# Patient Record
Sex: FEMALE | Race: WHITE | URBAN - METROPOLITAN AREA
[De-identification: names, ages, dates, MRNs, and addresses within clinical notes are randomized per-mention and may not be internally consistent; named-entity substitution may affect disease eponyms.]

---

## 2019-03-14 ENCOUNTER — OFFICE VISIT (OUTPATIENT)
Dept: INTERNAL MEDICINE CLINIC | Age: 39
End: 2019-03-14
Payer: COMMERCIAL

## 2019-03-14 VITALS
HEART RATE: 95 BPM | DIASTOLIC BLOOD PRESSURE: 91 MMHG | WEIGHT: 193.6 LBS | OXYGEN SATURATION: 100 % | TEMPERATURE: 98.1 F | SYSTOLIC BLOOD PRESSURE: 152 MMHG | RESPIRATION RATE: 16 BRPM

## 2019-03-14 DIAGNOSIS — R03.0 ELEVATED BLOOD PRESSURE READING: ICD-10-CM

## 2019-03-14 DIAGNOSIS — K64.9 HEMORRHOIDS, UNSPECIFIED HEMORRHOID TYPE: Primary | ICD-10-CM

## 2019-03-14 DIAGNOSIS — M54.50 ACUTE BILATERAL LOW BACK PAIN WITHOUT SCIATICA: ICD-10-CM

## 2019-03-14 LAB
BILIRUBIN URINE: NEGATIVE MG/DL
BLOOD, URINE: NEGATIVE
CLARITY: ABNORMAL
COLOR: ABNORMAL
GLUCOSE URINE: NEGATIVE MG/DL
KETONES, URINE: NEGATIVE MG/DL
LEUKOCYTE ESTERASE, URINE: ABNORMAL
MICROSCOPIC EXAMINATION: YES
NITRITE, URINE: NEGATIVE
PH UA: 7 (ref 5–8)
PROTEIN UA: NEGATIVE MG/DL
SPECIFIC GRAVITY UA: 1.01 (ref 1–1.03)
UROBILINOGEN, URINE: 0.2 E.U./DL

## 2019-03-14 PROCEDURE — 99213 OFFICE O/P EST LOW 20 MIN: CPT | Performed by: STUDENT IN AN ORGANIZED HEALTH CARE EDUCATION/TRAINING PROGRAM

## 2019-03-14 PROCEDURE — 81003 URINALYSIS AUTO W/O SCOPE: CPT

## 2019-03-14 ASSESSMENT — ENCOUNTER SYMPTOMS
NAUSEA: 0
VOMITING: 0
SHORTNESS OF BREATH: 0
CONSTIPATION: 0
COUGH: 0
BACK PAIN: 1
ABDOMINAL PAIN: 0
DIARRHEA: 0
SINUS PAIN: 0

## 2019-04-26 ENCOUNTER — OFFICE VISIT (OUTPATIENT)
Dept: INTERNAL MEDICINE CLINIC | Age: 39
End: 2019-04-26
Payer: COMMERCIAL

## 2019-04-26 VITALS
BODY MASS INDEX: 31.69 KG/M2 | SYSTOLIC BLOOD PRESSURE: 132 MMHG | HEART RATE: 73 BPM | TEMPERATURE: 98.2 F | OXYGEN SATURATION: 98 % | RESPIRATION RATE: 14 BRPM | DIASTOLIC BLOOD PRESSURE: 88 MMHG | HEIGHT: 65 IN | WEIGHT: 190.2 LBS

## 2019-04-26 DIAGNOSIS — R10.11 RIGHT UPPER QUADRANT ABDOMINAL PAIN: Primary | ICD-10-CM

## 2019-04-26 DIAGNOSIS — R12 HEARTBURN: ICD-10-CM

## 2019-04-26 DIAGNOSIS — R10.11 RIGHT UPPER QUADRANT ABDOMINAL PAIN: ICD-10-CM

## 2019-04-26 LAB
A/G RATIO: 1.3 (ref 1.1–2.2)
ALBUMIN SERPL-MCNC: 4.8 G/DL (ref 3.4–5)
ALP BLD-CCNC: 95 U/L (ref 40–129)
ALT SERPL-CCNC: 77 U/L (ref 10–40)
ANION GAP SERPL CALCULATED.3IONS-SCNC: 14 MMOL/L (ref 3–16)
AST SERPL-CCNC: 38 U/L (ref 15–37)
BASOPHILS ABSOLUTE: 0 K/UL (ref 0–0.2)
BASOPHILS RELATIVE PERCENT: 0.6 %
BILIRUB SERPL-MCNC: <0.2 MG/DL (ref 0–1)
BUN BLDV-MCNC: 11 MG/DL (ref 7–20)
CALCIUM SERPL-MCNC: 10.1 MG/DL (ref 8.3–10.6)
CHLORIDE BLD-SCNC: 104 MMOL/L (ref 99–110)
CO2: 25 MMOL/L (ref 21–32)
CREAT SERPL-MCNC: 0.7 MG/DL (ref 0.6–1.1)
EOSINOPHILS ABSOLUTE: 0.1 K/UL (ref 0–0.6)
EOSINOPHILS RELATIVE PERCENT: 1.7 %
GFR AFRICAN AMERICAN: >60
GFR NON-AFRICAN AMERICAN: >60
GLOBULIN: 3.6 G/DL
GLUCOSE BLD-MCNC: 93 MG/DL (ref 70–99)
HCT VFR BLD CALC: 42.9 % (ref 36–48)
HEMOGLOBIN: 14.4 G/DL (ref 12–16)
LYMPHOCYTES ABSOLUTE: 2.2 K/UL (ref 1–5.1)
LYMPHOCYTES RELATIVE PERCENT: 31.7 %
MCH RBC QN AUTO: 28.2 PG (ref 26–34)
MCHC RBC AUTO-ENTMCNC: 33.5 G/DL (ref 31–36)
MCV RBC AUTO: 84.1 FL (ref 80–100)
MONOCYTES ABSOLUTE: 0.4 K/UL (ref 0–1.3)
MONOCYTES RELATIVE PERCENT: 6.2 %
NEUTROPHILS ABSOLUTE: 4.2 K/UL (ref 1.7–7.7)
NEUTROPHILS RELATIVE PERCENT: 59.8 %
PDW BLD-RTO: 14 % (ref 12.4–15.4)
PLATELET # BLD: 344 K/UL (ref 135–450)
PMV BLD AUTO: 6.9 FL (ref 5–10.5)
POTASSIUM SERPL-SCNC: 4.5 MMOL/L (ref 3.5–5.1)
RBC # BLD: 5.11 M/UL (ref 4–5.2)
SODIUM BLD-SCNC: 143 MMOL/L (ref 136–145)
TOTAL PROTEIN: 8.4 G/DL (ref 6.4–8.2)
WBC # BLD: 7 K/UL (ref 4–11)

## 2019-04-26 PROCEDURE — 1036F TOBACCO NON-USER: CPT | Performed by: INTERNAL MEDICINE

## 2019-04-26 PROCEDURE — G8417 CALC BMI ABV UP PARAM F/U: HCPCS | Performed by: INTERNAL MEDICINE

## 2019-04-26 PROCEDURE — 99213 OFFICE O/P EST LOW 20 MIN: CPT | Performed by: INTERNAL MEDICINE

## 2019-04-26 PROCEDURE — G8427 DOCREV CUR MEDS BY ELIG CLIN: HCPCS | Performed by: INTERNAL MEDICINE

## 2019-04-26 RX ORDER — FAMOTIDINE 20 MG/1
20 TABLET, FILM COATED ORAL 2 TIMES DAILY PRN
Qty: 60 TABLET | Refills: 0 | Status: SHIPPED | OUTPATIENT
Start: 2019-04-26

## 2019-04-26 ASSESSMENT — ENCOUNTER SYMPTOMS
ABDOMINAL PAIN: 1
BACK PAIN: 0
SHORTNESS OF BREATH: 0
DIARRHEA: 0
CONSTIPATION: 0
ABDOMINAL DISTENTION: 0
NAUSEA: 0
BLOOD IN STOOL: 0
VOMITING: 0

## 2019-04-26 ASSESSMENT — PATIENT HEALTH QUESTIONNAIRE - PHQ9
1. LITTLE INTEREST OR PLEASURE IN DOING THINGS: 0
SUM OF ALL RESPONSES TO PHQ QUESTIONS 1-9: 0
2. FEELING DOWN, DEPRESSED OR HOPELESS: 0
SUM OF ALL RESPONSES TO PHQ QUESTIONS 1-9: 0
SUM OF ALL RESPONSES TO PHQ9 QUESTIONS 1 & 2: 0

## 2019-04-26 NOTE — PROGRESS NOTES
Zachary Montoya   YOB: 1980    Date of Visit:  4/26/2019    Chief Complaint   Patient presents with    New Patient    Establish Care    Abdominal Pain    Gastroesophageal Reflux       HPI  Pt c/o right upper quadrant abdominal pain for at least 1 month. Abdominal pain is dull and intermittent. Pt denies nausea, vomiting, fever. Pain is not related to meals. Quit drinking alcohol 3 weeks after drinking a couple of beers per day. Pt states she has had heartburn for the past years. Pt states it comes on and goes away. Pt states no particular food causes but it is better with stopping drinking. Pt has heartburn was twice a week until she stopped drinking 3 weeks ago. Pt hasn't had hardly any heartburn in the past 3 weeks. Pt states she has taken Tums. Pt drinks 2-3 cups of coffee per day, eats spicy foods, drinks iced tea,    Review of Systems   Constitutional: Negative for appetite change, chills and fever. Respiratory: Negative for shortness of breath. Cardiovascular: Negative for chest pain. Gastrointestinal: Positive for abdominal pain. Negative for abdominal distention, blood in stool, constipation, diarrhea, nausea and vomiting. Genitourinary: Negative for dysuria, frequency and hematuria. Musculoskeletal: Negative for back pain. Neurological: Negative for dizziness. History reviewed. No pertinent past medical history. History reviewed. No pertinent surgical history. No outpatient medications have been marked as taking for the 4/26/19 encounter (Office Visit) with Olinda Verde MD.         Allergies   Allergen Reactions    Pcn [Penicillins]        Social History     Tobacco Use    Smoking status: Never Smoker    Smokeless tobacco: Never Used   Substance Use Topics    Alcohol use: Yes     Alcohol/week: 8.4 oz     Types: 14 Cans of beer per week     Comment: have not drank in three weeks.        Family History   Problem Relation Age of Onset    High Blood Pressure Mother     Heart Disease Father        Immunization History   Administered Date(s) Administered    Tdap (Boostrix, Adacel) 04/29/2014       Vitals:    04/26/19 1041   BP: 132/88   Site: Right Upper Arm   Position: Sitting   Cuff Size: Large Adult   Pulse: 73   Resp: 14   Temp: 98.2 °F (36.8 °C)   TempSrc: Oral   SpO2: 98%   Weight: 190 lb 3.2 oz (86.3 kg)   Height: 5' 5\" (1.651 m)     Body mass index is 31.65 kg/m². Physical Exam   Constitutional: She appears well-developed and well-nourished. No distress. HENT:   Mouth/Throat: Oropharynx is clear and moist.   Eyes: Pupils are equal, round, and reactive to light. EOM are normal.   Neck: Neck supple. No thyromegaly present. Cardiovascular: Normal rate, regular rhythm, S1 normal, S2 normal and normal heart sounds. Exam reveals no gallop and no friction rub. No murmur heard. Pulmonary/Chest: Effort normal and breath sounds normal. No respiratory distress. She has no wheezes. Abdominal: Soft. Normal appearance and bowel sounds are normal. She exhibits no distension. There is no hepatosplenomegaly. There is no tenderness. Musculoskeletal: She exhibits no edema. Nursing note reviewed. No results found for this visit on 04/26/19. Assessment/Plan     1. Right upper quadrant abdominal pain  - US Abdomen Complete; Future  - CBC Auto Differential; Future  - Comprehensive Metabolic Panel; Future  -Tylenol or Advil as needed    2. Heartburn  - famotidine (PEPCID) 20 MG tablet; Take 1 tablet by mouth 2 times daily as needed (heartburn)  Dispense: 60 tablet; Refill: 0  - Decrease caffeine, avoid spicy foods, avoid tomato based foods  - Eat small meals instead of large meals  - Wait 2-3 hours after eating before lying down        Discussed medications with patient, who voiced understanding of their use and indications. All questions answered.     Return in about 2 weeks (around 5/10/2019) for annual physical exam, abdominal pain, and heartburn.

## 2019-05-01 ENCOUNTER — HOSPITAL ENCOUNTER (OUTPATIENT)
Dept: ULTRASOUND IMAGING | Age: 39
Discharge: HOME | End: 2019-05-01
Payer: COMMERCIAL

## 2019-05-01 DIAGNOSIS — R10.11 RIGHT UPPER QUADRANT ABDOMINAL PAIN: ICD-10-CM

## 2019-05-01 PROCEDURE — 76705 ECHO EXAM OF ABDOMEN: CPT

## 2019-05-21 ENCOUNTER — OFFICE VISIT (OUTPATIENT)
Dept: INTERNAL MEDICINE CLINIC | Age: 39
End: 2019-05-21
Payer: COMMERCIAL

## 2019-05-21 VITALS
DIASTOLIC BLOOD PRESSURE: 88 MMHG | SYSTOLIC BLOOD PRESSURE: 120 MMHG | OXYGEN SATURATION: 99 % | WEIGHT: 194.6 LBS | TEMPERATURE: 98.1 F | RESPIRATION RATE: 14 BRPM | BODY MASS INDEX: 32.42 KG/M2 | HEIGHT: 65 IN | HEART RATE: 81 BPM

## 2019-05-21 DIAGNOSIS — Z00.00 ANNUAL PHYSICAL EXAM: Primary | ICD-10-CM

## 2019-05-21 DIAGNOSIS — R10.11 RIGHT UPPER QUADRANT ABDOMINAL PAIN: ICD-10-CM

## 2019-05-21 DIAGNOSIS — R12 HEARTBURN: ICD-10-CM

## 2019-05-21 DIAGNOSIS — R74.8 ELEVATED LIVER ENZYMES: ICD-10-CM

## 2019-05-21 PROCEDURE — 81002 URINALYSIS NONAUTO W/O SCOPE: CPT | Performed by: INTERNAL MEDICINE

## 2019-05-21 PROCEDURE — 99395 PREV VISIT EST AGE 18-39: CPT | Performed by: INTERNAL MEDICINE

## 2019-05-21 ASSESSMENT — ENCOUNTER SYMPTOMS: ABDOMINAL PAIN: 1

## 2019-05-21 NOTE — PATIENT INSTRUCTIONS
1. Annual physical exam  - POCT Urinalysis no Micro  - Comprehensive Metabolic Panel; Future  - Lipid Panel; Future  - TSH without Reflex; Future  - Patient plans to schedule pap smear with Gynecology  - Tdap done on 4/29/14  - Regular aerobic exercise    2. Right upper quadrant abdominal pain  - abdominal ultrasound unremarkable for cause of pain  - Referral to Kimberly Escobar MD, Gastroenterology, [facility]    3. Heartburn  -stable  -Continue lifestyle modification  -Decrease caffeine, avoid spicy foods, avoid tomato based foods  -Eat small meals instead of large meals  -Wait 2-3 hours after eating before lying down    4. Elevated liver enzymes   - Comprehensive Metabolic Panel; Future  - HEPATITIS PANEL, ACUTE; Future  - Referral to Kimbrely Escobar MD, Gastroenterology, [facility]          Patient Education        Well Visit, Ages 25 to 48: Care Instructions  Your Care Instructions    Physical exams can help you stay healthy. Your doctor has checked your overall health and may have suggested ways to take good care of yourself. He or she also may have recommended tests. At home, you can help prevent illness with healthy eating, regular exercise, and other steps. Follow-up care is a key part of your treatment and safety. Be sure to make and go to all appointments, and call your doctor if you are having problems. It's also a good idea to know your test results and keep a list of the medicines you take. How can you care for yourself at home? · Reach and stay at a healthy weight. This will lower your risk for many problems, such as obesity, diabetes, heart disease, and high blood pressure. · Get at least 30 minutes of physical activity on most days of the week. Walking is a good choice. You also may want to do other activities, such as running, swimming, cycling, or playing tennis or team sports. Discuss any changes in your exercise program with your doctor.   · Do not smoke or allow others to smoke around you. If you need help quitting, talk to your doctor about stop-smoking programs and medicines. These can increase your chances of quitting for good. · Talk to your doctor about whether you have any risk factors for sexually transmitted infections (STIs). Having one sex partner (who does not have STIs and does not have sex with anyone else) is a good way to avoid these infections. · Use birth control if you do not want to have children at this time. Talk with your doctor about the choices available and what might be best for you. · Protect your skin from too much sun. When you're outdoors from 10 a.m. to 4 p.m., stay in the shade or cover up with clothing and a hat with a wide brim. Wear sunglasses that block UV rays. Even when it's cloudy, put broad-spectrum sunscreen (SPF 30 or higher) on any exposed skin. · See a dentist one or two times a year for checkups and to have your teeth cleaned. · Wear a seat belt in the car. Follow your doctor's advice about when to have certain tests. These tests can spot problems early. For everyone  · Cholesterol. Have the fat (cholesterol) in your blood tested after age 21. Your doctor will tell you how often to have this done based on your age, family history, or other things that can increase your risk for heart disease. · Blood pressure. Have your blood pressure checked during a routine doctor visit. Your doctor will tell you how often to check your blood pressure based on your age, your blood pressure results, and other factors. · Vision. Talk with your doctor about how often to have a glaucoma test.  · Diabetes. Ask your doctor whether you should have tests for diabetes. · Colon cancer. Your risk for colorectal cancer gets higher as you get older. Some experts say that adults should start regular screening at age 48 and stop at age 76. Others say to start before age 48 or continue after age 76.  Talk with your doctor about your risk and when to start and stop screening. For women  · Breast exam and mammogram. Talk to your doctor about when you should have a clinical breast exam and a mammogram. Medical experts differ on whether and how often women under 50 should have these tests. Your doctor can help you decide what is right for you. · Cervical cancer screening test and pelvic exam. Begin with a Pap test at age 24. The test often is part of a pelvic exam. Starting at age 27, you may choose to have a Pap test, an HPV test, or both tests at the same time (called co-testing). Talk with your doctor about how often to have testing. · Tests for sexually transmitted infections (STIs). Ask whether you should have tests for STIs. You may be at risk if you have sex with more than one person, especially if your partners do not wear condoms. For men  · Tests for sexually transmitted infections (STIs). Ask whether you should have tests for STIs. You may be at risk if you have sex with more than one person, especially if you do not wear a condom. · Testicular cancer exam. Ask your doctor whether you should check your testicles regularly. · Prostate exam. Talk to your doctor about whether you should have a blood test (called a PSA test) for prostate cancer. Experts differ on whether and when men should have this test. Some experts suggest it if you are older than 39 and are -American or have a father or brother who got prostate cancer when he was younger than 72. When should you call for help? Watch closely for changes in your health, and be sure to contact your doctor if you have any problems or symptoms that concern you. Where can you learn more? Go to [email or web address] org and sign in to your [employer] account. Enter P072 in the [facility] box to learn more about \"Well Visit, Ages 25 to 48: Care Instructions. \"     If you do not have an account, please click on the \"Sign Up Now\" link.   Current as of: December 13, 2018  Content Version: 12.0  © 5199-4771 [employer]wise, Incorporated. Care instructions adapted under license by [facility] [place] ([facility]). If you have questions about a medical condition or this instruction, always ask your healthcare professional. [address] any warranty or liability for your use of this information.

## 2019-05-21 NOTE — PROGRESS NOTES
is 32.38 kg/m². Physical Exam   Constitutional: She is oriented to person, place, and time. She appears well-developed and well-nourished. No distress. HENT:   Head: Normocephalic and atraumatic. Right Ear: Hearing, tympanic membrane and ear canal normal.   Left Ear: Hearing, tympanic membrane and ear canal normal.   Nose: Nose normal.   Mouth/Throat: Uvula is midline, oropharynx is clear and moist and mucous membranes are normal.   Eyes: Pupils are equal, round, and reactive to light. Conjunctivae, EOM and lids are normal.   Neck: Neck supple. Carotid bruit is not present. No thyromegaly present. Cardiovascular: Normal rate, regular rhythm, S1 normal, S2 normal, normal heart sounds and intact distal pulses. Exam reveals no gallop and no friction rub. No murmur heard. Pulmonary/Chest: Effort normal and breath sounds normal. No respiratory distress. She has no wheezes. She has no rhonchi. She has no rales. Right breast exhibits no inverted nipple, no mass, no nipple discharge, no skin change and no tenderness. Left breast exhibits no inverted nipple, no mass, no nipple discharge, no skin change and no tenderness. Breasts are symmetrical.   Abdominal: Soft. Bowel sounds are normal. She exhibits no distension and no mass. There is no hepatosplenomegaly. There is no tenderness. There is no rebound. Genitourinary:   Genitourinary Comments: deferred   Musculoskeletal: Normal range of motion. She exhibits no edema. Right shoulder: She exhibits normal range of motion and no tenderness. Left shoulder: She exhibits normal range of motion and no tenderness. Right elbow: She exhibits no swelling. No tenderness found. Left elbow: She exhibits no swelling. No tenderness found. Right wrist: She exhibits no tenderness and no swelling. Left wrist: She exhibits no tenderness and no swelling. Right hip: She exhibits no tenderness.         Left hip: She exhibits no CREATININE 04/26/2019 0.7     GFR Non- 04/26/2019 >60     GFR  04/26/2019 >60     Calcium 04/26/2019 10.1     Total Protein 04/26/2019 8.4*    Alb 04/26/2019 4.8     Albumin/Globulin Ratio 04/26/2019 1.3     Total Bilirubin 04/26/2019 <0.2     Alkaline Phosphatase 04/26/2019 95     ALT 04/26/2019 77*    AST 04/26/2019 38*    Globulin 04/26/2019 3.6     WBC 04/26/2019 7.0     RBC 04/26/2019 5.11     Hemoglobin 04/26/2019 14.4     Hematocrit 04/26/2019 42.9     MCV 04/26/2019 84.1     MCH 04/26/2019 28.2     MCHC 04/26/2019 33.5     RDW 04/26/2019 14.0     Platelets 77/52/2177 344     MPV 04/26/2019 6.9     Neutrophils % 04/26/2019 59.8     Lymphocytes % 04/26/2019 31.7     Monocytes % 04/26/2019 6.2     Eosinophils % 04/26/2019 1.7     Basophils % 04/26/2019 0.6     Neutrophils # 04/26/2019 4.2     Lymphocytes # 04/26/2019 2.2     Monocytes # 04/26/2019 0.4     Eosinophils # 04/26/2019 0.1     Basophils # 04/26/2019 0.0    Office Visit on 03/14/2019   Component Date Value    Color, UA 03/14/2019 Not Entered     Clarity, UA 03/14/2019 Not Entered     Glucose, Ur 03/14/2019 Negative     Bilirubin Urine 03/14/2019 Negative     Ketones, Urine 03/14/2019 Negative     Specific Gravity, UA 03/14/2019 1.015     Blood, Urine 03/14/2019 Negative     pH, UA 03/14/2019 7.0     Protein, UA 03/14/2019 Negative     Urobilinogen, Urine 03/14/2019 0.2     Nitrite, Urine 03/14/2019 Negative     Leukocyte Esterase, Urine 03/14/2019 TRACE*    Microscopic Examination 03/14/2019 Yes        No results found for this visit on 05/21/19. EXAM: ULTRASOUND ABDOMEN LIMITED 5/1/2019       INDICATION: Right upper quadrant pain       COMPARISON: None       FINDINGS:       PANCREAS: Visualized portions of the pancreas are unremarkable.       LIVER: The liver was homogeneous in its echogenicity without focal abnormality.  The portal vein was patent.       GALLBLADDER: The gallbladder was adequately distended. No internal echoes, wall thickening or pericholecystic fluid was demonstrated.       COMMON DUCT: The common bile duct was mildly prominent segments 7 8 mm. There is no evidence of intrahepatic ductal dilatation.       RIGHT KIDNEY: The right kidney measured 12.2 cm in length. There is no hydronephrosis.          OTHER FINDINGS: None.           Impression       No evidence of cholelithiasis or cholecystitis.       Mildly prominent common bile duct       Assessment/Plan     1. Annual physical exam  - POCT Urinalysis no Micro  - Comprehensive Metabolic Panel; Future  - Lipid Panel; Future  - TSH without Reflex; Future  - Patient plans to schedule pap smear with Gynecology  - Tdap done on 4/29/14  - Regular aerobic exercise    2. Right upper quadrant abdominal pain  - abdominal ultrasound unremarkable for cause of pain  - Referral to Kale Lara MD, Gastroenterology, [facility]    3. Heartburn  -stable  -Continue lifestyle modification  -Decrease caffeine, avoid spicy foods, avoid tomato based foods  -Eat small meals instead of large meals  -Wait 2-3 hours after eating before lying down    4. Elevated liver enzymes   - Comprehensive Metabolic Panel;  Future  - HEPATITIS PANEL, ACUTE; Future  - Referral to Kale Lara MD, Gastroenterology, [facility]        Return in about 1 year (around 5/21/2020) for annual physical exam.

## 2019-05-22 ASSESSMENT — ENCOUNTER SYMPTOMS
VOICE CHANGE: 0
CHOKING: 0
FACIAL SWELLING: 0
VOMITING: 0
RHINORRHEA: 0
EYE DISCHARGE: 0
EYE ITCHING: 0
DIARRHEA: 0
CONSTIPATION: 0
EYE REDNESS: 0
SORE THROAT: 0
ANAL BLEEDING: 0
BLOOD IN STOOL: 0
WHEEZING: 0
SHORTNESS OF BREATH: 0
EYE PAIN: 0
COUGH: 0
NAUSEA: 0
RECTAL PAIN: 0
ABDOMINAL DISTENTION: 0
PHOTOPHOBIA: 0
TROUBLE SWALLOWING: 0
BACK PAIN: 0
CHEST TIGHTNESS: 0
SINUS PRESSURE: 0
APNEA: 0

## 2019-05-23 DIAGNOSIS — Z00.00 ANNUAL PHYSICAL EXAM: ICD-10-CM

## 2019-05-23 DIAGNOSIS — R74.8 ELEVATED LIVER ENZYMES: ICD-10-CM

## 2019-05-23 LAB
A/G RATIO: 1.5 (ref 1.1–2.2)
ALBUMIN SERPL-MCNC: 4.5 G/DL (ref 3.4–5)
ALP BLD-CCNC: 99 U/L (ref 40–129)
ALT SERPL-CCNC: 102 U/L (ref 10–40)
ANION GAP SERPL CALCULATED.3IONS-SCNC: 12 MMOL/L (ref 3–16)
AST SERPL-CCNC: 60 U/L (ref 15–37)
BILIRUB SERPL-MCNC: <0.2 MG/DL (ref 0–1)
BUN BLDV-MCNC: 15 MG/DL (ref 7–20)
CALCIUM SERPL-MCNC: 9.8 MG/DL (ref 8.3–10.6)
CHLORIDE BLD-SCNC: 102 MMOL/L (ref 99–110)
CHOLESTEROL, TOTAL: 201 MG/DL (ref 0–199)
CO2: 27 MMOL/L (ref 21–32)
CREAT SERPL-MCNC: 0.8 MG/DL (ref 0.6–1.1)
GFR AFRICAN AMERICAN: >60
GFR NON-AFRICAN AMERICAN: >60
GLOBULIN: 3.1 G/DL
GLUCOSE BLD-MCNC: 105 MG/DL (ref 70–99)
HAV IGM SER IA-ACNC: NORMAL
HDLC SERPL-MCNC: 47 MG/DL (ref 40–60)
HEPATITIS B CORE IGM ANTIBODY: NORMAL
HEPATITIS B SURFACE ANTIGEN INTERPRETATION: NORMAL
HEPATITIS C ANTIBODY INTERPRETATION: NORMAL
LDL CHOLESTEROL CALCULATED: 126 MG/DL
POTASSIUM SERPL-SCNC: 4.6 MMOL/L (ref 3.5–5.1)
SODIUM BLD-SCNC: 141 MMOL/L (ref 136–145)
TOTAL PROTEIN: 7.6 G/DL (ref 6.4–8.2)
TRIGL SERPL-MCNC: 139 MG/DL (ref 0–150)
TSH SERPL DL<=0.05 MIU/L-ACNC: 2.59 UIU/ML (ref 0.27–4.2)
VLDLC SERPL CALC-MCNC: 28 MG/DL